# Patient Record
Sex: FEMALE | URBAN - METROPOLITAN AREA
[De-identification: names, ages, dates, MRNs, and addresses within clinical notes are randomized per-mention and may not be internally consistent; named-entity substitution may affect disease eponyms.]

---

## 2023-01-30 ENCOUNTER — APPOINTMENT (RX ONLY)
Dept: URBAN - METROPOLITAN AREA CLINIC 151 | Facility: CLINIC | Age: 28
Setting detail: DERMATOLOGY
End: 2023-01-30

## 2023-01-30 DIAGNOSIS — L20.89 OTHER ATOPIC DERMATITIS: ICD-10-CM | Status: INADEQUATELY CONTROLLED

## 2023-01-30 PROCEDURE — ? COUNSELING

## 2023-01-30 PROCEDURE — ? PRESCRIPTION MEDICATION MANAGEMENT

## 2023-01-30 PROCEDURE — ? DIAGNOSIS COMMENT

## 2023-01-30 PROCEDURE — 99204 OFFICE O/P NEW MOD 45 MIN: CPT

## 2023-01-30 PROCEDURE — ? PRESCRIPTION

## 2023-01-30 RX ORDER — RUXOLITINIB 15 MG/G
CREAM TOPICAL
Qty: 60 | Refills: 2 | Status: ERX | COMMUNITY
Start: 2023-01-30

## 2023-01-30 RX ADMIN — RUXOLITINIB: 15 CREAM TOPICAL at 00:00

## 2023-01-30 NOTE — HPI: ECZEMA (PATIENT REPORTED)
Where Is Your Eczema Located?: All over body (upper shoulders, in between thighs, behind knee caps)
List Prescription Topical Steroids You Tried (Separate Each Name With A Comma):: Clobetasol
Additional Comments (Use Complete Sentences): Current flare has been present for 3-4 weeks.

## 2023-01-30 NOTE — PROCEDURE: DIAGNOSIS COMMENT
Comment: Chronic for several years. Flaring the last 3-4 weeks ago. Recommended patient use Dove unscented bar soap and fragrance free moisturizer. Moisturize regularly after applying Opzelura twice daily.
Detail Level: Detailed
Render Risk Assessment In Note?: no